# Patient Record
Sex: FEMALE | Race: ASIAN | NOT HISPANIC OR LATINO | URBAN - METROPOLITAN AREA
[De-identification: names, ages, dates, MRNs, and addresses within clinical notes are randomized per-mention and may not be internally consistent; named-entity substitution may affect disease eponyms.]

---

## 2021-07-27 ENCOUNTER — INPATIENT (INPATIENT)
Facility: HOSPITAL | Age: 21
LOS: 4 days | Discharge: HOME | End: 2021-08-01
Attending: SPECIALIST | Admitting: SPECIALIST
Payer: MEDICAID

## 2021-07-27 ENCOUNTER — EMERGENCY (EMERGENCY)
Facility: HOSPITAL | Age: 21
LOS: 1 days | Discharge: STILL ADMITTED OR ADMINISTRATIVE | End: 2021-07-27
Admitting: EMERGENCY MEDICINE
Payer: SELF-PAY

## 2021-07-27 VITALS
DIASTOLIC BLOOD PRESSURE: 86 MMHG | HEART RATE: 91 BPM | SYSTOLIC BLOOD PRESSURE: 125 MMHG | RESPIRATION RATE: 17 BRPM | TEMPERATURE: 98 F

## 2021-07-27 VITALS
RESPIRATION RATE: 18 BRPM | SYSTOLIC BLOOD PRESSURE: 127 MMHG | TEMPERATURE: 98 F | HEART RATE: 109 BPM | OXYGEN SATURATION: 100 % | DIASTOLIC BLOOD PRESSURE: 88 MMHG

## 2021-07-27 DIAGNOSIS — Z3A.00 WEEKS OF GESTATION OF PREGNANCY NOT SPECIFIED: ICD-10-CM

## 2021-07-27 DIAGNOSIS — O41.00X0 OLIGOHYDRAMNIOS, UNSPECIFIED TRIMESTER, NOT APPLICABLE OR UNSPECIFIED: ICD-10-CM

## 2021-07-27 DIAGNOSIS — O26.899 OTHER SPECIFIED PREGNANCY RELATED CONDITIONS, UNSPECIFIED TRIMESTER: ICD-10-CM

## 2021-07-27 LAB
BASOPHILS # BLD AUTO: 0.03 K/UL — SIGNIFICANT CHANGE UP (ref 0–0.2)
BASOPHILS NFR BLD AUTO: 0.3 % — SIGNIFICANT CHANGE UP (ref 0–2)
BLD GP AB SCN SERPL QL: NEGATIVE — SIGNIFICANT CHANGE UP
EOSINOPHIL # BLD AUTO: 0.05 K/UL — SIGNIFICANT CHANGE UP (ref 0–0.5)
EOSINOPHIL NFR BLD AUTO: 0.5 % — SIGNIFICANT CHANGE UP (ref 0–6)
HCT VFR BLD CALC: 37.4 % — SIGNIFICANT CHANGE UP (ref 34.5–45)
HGB BLD-MCNC: 11.8 G/DL — SIGNIFICANT CHANGE UP (ref 11.5–15.5)
IANC: 6.89 K/UL — SIGNIFICANT CHANGE UP (ref 1.5–8.5)
IMM GRANULOCYTES NFR BLD AUTO: 0.3 % — SIGNIFICANT CHANGE UP (ref 0–1.5)
LYMPHOCYTES # BLD AUTO: 1.97 K/UL — SIGNIFICANT CHANGE UP (ref 1–3.3)
LYMPHOCYTES # BLD AUTO: 20.4 % — SIGNIFICANT CHANGE UP (ref 13–44)
MCHC RBC-ENTMCNC: 24.9 PG — LOW (ref 27–34)
MCHC RBC-ENTMCNC: 31.6 GM/DL — LOW (ref 32–36)
MCV RBC AUTO: 78.9 FL — LOW (ref 80–100)
MONOCYTES # BLD AUTO: 0.67 K/UL — SIGNIFICANT CHANGE UP (ref 0–0.9)
MONOCYTES NFR BLD AUTO: 7 % — SIGNIFICANT CHANGE UP (ref 2–14)
NEUTROPHILS # BLD AUTO: 6.89 K/UL — SIGNIFICANT CHANGE UP (ref 1.8–7.4)
NEUTROPHILS NFR BLD AUTO: 71.5 % — SIGNIFICANT CHANGE UP (ref 43–77)
NRBC # BLD: 0 /100 WBCS — SIGNIFICANT CHANGE UP
NRBC # FLD: 0 K/UL — SIGNIFICANT CHANGE UP
PCP SPEC-MCNC: SIGNIFICANT CHANGE UP
PLATELET # BLD AUTO: 267 K/UL — SIGNIFICANT CHANGE UP (ref 150–400)
RBC # BLD: 4.74 M/UL — SIGNIFICANT CHANGE UP (ref 3.8–5.2)
RBC # FLD: 15.6 % — HIGH (ref 10.3–14.5)
RH IG SCN BLD-IMP: POSITIVE — SIGNIFICANT CHANGE UP
WBC # BLD: 9.64 K/UL — SIGNIFICANT CHANGE UP (ref 3.8–10.5)
WBC # FLD AUTO: 9.64 K/UL — SIGNIFICANT CHANGE UP (ref 3.8–10.5)

## 2021-07-27 PROCEDURE — L9993: CPT

## 2021-07-27 PROCEDURE — 59514 CESAREAN DELIVERY ONLY: CPT | Mod: U9,UB,GC

## 2021-07-27 RX ORDER — SODIUM CHLORIDE 9 MG/ML
1000 INJECTION, SOLUTION INTRAVENOUS
Refills: 0 | Status: DISCONTINUED | OUTPATIENT
Start: 2021-07-27 | End: 2021-07-28

## 2021-07-27 RX ORDER — SODIUM CHLORIDE 9 MG/ML
1000 INJECTION, SOLUTION INTRAVENOUS
Refills: 0 | Status: DISCONTINUED | OUTPATIENT
Start: 2021-07-27 | End: 2021-07-27

## 2021-07-27 RX ORDER — OXYTOCIN 10 UNIT/ML
333.33 VIAL (ML) INJECTION
Qty: 20 | Refills: 0 | Status: DISCONTINUED | OUTPATIENT
Start: 2021-07-27 | End: 2021-07-27

## 2021-07-27 RX ORDER — OXYTOCIN 10 UNIT/ML
333.33 VIAL (ML) INJECTION
Qty: 20 | Refills: 0 | Status: DISCONTINUED | OUTPATIENT
Start: 2021-07-27 | End: 2021-08-01

## 2021-07-27 RX ADMIN — SODIUM CHLORIDE 125 MILLILITER(S): 9 INJECTION, SOLUTION INTRAVENOUS at 18:51

## 2021-07-27 NOTE — OB RN PATIENT PROFILE - NS_OBGYNHISTORY_OBGYN_ALL_OB_FT
PNC with MD @ [place], seen in [facility] yesterday was told low FATOU and needed induction; signed out AMA after waiting 16 hours

## 2021-07-27 NOTE — OB PROVIDER H&P - NSHPPHYSICALEXAM_GEN_ALL_CORE
SVE: 1/50/-3  TAS: cephalic presentation, fundal placenta,   FATOU 6.28  abdomen: gravid, soft, nontender  FHT: category 1 tracing  TOCO: mild irregular contractions  EFW: 3175 grams  GBS unknown    Vital Signs Last 24 Hrs  T(C): 36.8 (27 Jul 2021 15:10), Max: 36.9 (27 Jul 2021 13:27)  T(F): 98.24 (27 Jul 2021 15:10), Max: 98.5 (27 Jul 2021 13:27)  HR: 77 (27 Jul 2021 17:13) (77 - 109)  BP: 126/91 (27 Jul 2021 17:13) (113/80 - 128/95)  BP(mean): --  RR: 17 (27 Jul 2021 15:10) (17 - 18)  SpO2: 100% (27 Jul 2021 13:27) (100% - 100%)

## 2021-07-27 NOTE — OB PROVIDER H&P - PROBLEM SELECTOR PLAN 1
d/w MD Sample  admit patient for post date induction  will call [facility] office for prenatal records  routine lab orders  for PO cytotec and cervical balloon  consents obtained  COVID PCR sent on patient and partner  GBS unknown

## 2021-07-27 NOTE — OB PROVIDER H&P - PROBLEM SELECTOR PLAN 2
d/w MD Sample  admit patient for post date induction  will call [facility] office for prenatal records  routine lab orders  for PO cytotec and cervical balloon  consents obtained  COVID PCR sent on patient  GBS unknown

## 2021-07-27 NOTE — OB RN PATIENT PROFILE - NS_SUPPORTPERSONNAME_OBGYN_ALL_OB_FT
Anastasia [facility] [phone] Anastasia [facility] [phone]

## 2021-07-27 NOTE — OB PROVIDER TRIAGE NOTE - NSOBPROVIDERNOTE_OBGYN_ALL_OB_FT
d/w MD Sample  admit patient for post date induction  will call [facility] office for prenatal records  routine lab orders  for PO cytotec and cervical balloon  consents obtained  COVID PCR sent on patient d/w MD Sample  admit patient for post date induction  will call [facility] office for prenatal records  routine lab orders  for PO cytotec and cervical balloon  consents obtained  COVID PCR sent on patient  GBS unknown

## 2021-07-27 NOTE — OB PROVIDER TRIAGE NOTE - HISTORY OF PRESENT ILLNESS
20 y/o  at 41.2 weeks gestation after leaving [facility] AMA early this AM after being advised for IOL for oligohydramnios w/ FATOU of 4 and waiting too long in ER. She received PNC with MD Xiomara Reynolds in [place], and was instructed to go to hospital yesterday after office OB appt and scanned showing FATOU of 5.2. [facility] did SVE @ 1cm, and gave her antibiotics for unknown GBS status. Denies lof, vb, uterine ctx, cramping. Reports good FM. Patient and partner not COVID vaccinated.    Pt reports AP course uncomplicated thus far  NKDA  Current medications:  -PNV  -Iron  -Vitamin D  Denies OBGYN history  Denies medical history  Denies surgical history

## 2021-07-27 NOTE — OB PROVIDER H&P - ATTENDING COMMENTS
OB Service Attending    P0 at 41 weeks presenting requesting for IOL. Patient without plan for IOL with her own physician. Will admit given late term and no safe discharge plan  -PO cytotec and cervical balloon    N Dayanna-MD Omar

## 2021-07-27 NOTE — OB RN TRIAGE NOTE - NS_OBGYNHISTORY_OBGYN_ALL_OB_FT
PNC with MD @ [place], seen in [facility] yesterday was told low FATOU and needed induction; signed out AMA PNC with MD @ [place], seen in [facility] yesterday was told low FATOU and needed induction; signed out AMA after waiting 16 hours

## 2021-07-27 NOTE — OB PROVIDER H&P - NS_OBGYNHISTORY_OBGYN_ALL_OB_FT
PNC with MD @ [place], seen in [facility] yesterday was told low FATOU and needed induction; signed out AMA after waiting 16 hours

## 2021-07-27 NOTE — OB PROVIDER H&P - HISTORY OF PRESENT ILLNESS
20 y/o  at 41.2 weeks gestation after leaving [facility] AMA early this AM after being advised for IOL for oligohydramnios w/ FATOU of 4 and waiting too long in ER. She received PNC with MD Xiomara Reynolds in [place], and was instructed to go to hospital yesterday after office OB appt and scanned showing FATOU of 5.2. [facility] did SVE @ 1cm, and gave her antibiotics for unknown GBS status. Denies lof, vb, uterine ctx, cramping. Reports good FM. Patient and partner not COVID vaccinated.    Pt reports AP course uncomplicated thus far  NKDA  Current medications:  -PNV  -Iron  -Vitamin D  Denies OBGYN history  Denies medical history  Denies surgical history

## 2021-07-27 NOTE — OB PROVIDER TRIAGE NOTE - NSHPPHYSICALEXAM_GEN_ALL_CORE
SVE: 1/50/-3  TAS: cephalic presentation, fundal placenta,   FATOU 6.28  abdomen: gravid, soft, nontender  FHT: category 1 tracing  TOCO: mild irregular contractions  Vital Signs Last 24 Hrs  T(C): 36.8 (27 Jul 2021 15:10), Max: 36.9 (27 Jul 2021 13:27)  T(F): 98.24 (27 Jul 2021 15:10), Max: 98.5 (27 Jul 2021 13:27)  HR: 77 (27 Jul 2021 17:13) (77 - 109)  BP: 126/91 (27 Jul 2021 17:13) (113/80 - 128/95)  BP(mean): --  RR: 17 (27 Jul 2021 15:10) (17 - 18)  SpO2: 100% (27 Jul 2021 13:27) (100% - 100%) SVE: 1/50/-3  TAS: cephalic presentation, fundal placenta,   FATOU 6.28  abdomen: gravid, soft, nontender  FHT: category 1 tracing  TOCO: mild irregular contractions  EFW:    Vital Signs Last 24 Hrs  T(C): 36.8 (27 Jul 2021 15:10), Max: 36.9 (27 Jul 2021 13:27)  T(F): 98.24 (27 Jul 2021 15:10), Max: 98.5 (27 Jul 2021 13:27)  HR: 77 (27 Jul 2021 17:13) (77 - 109)  BP: 126/91 (27 Jul 2021 17:13) (113/80 - 128/95)  BP(mean): --  RR: 17 (27 Jul 2021 15:10) (17 - 18)  SpO2: 100% (27 Jul 2021 13:27) (100% - 100%) SVE: 1/50/-3  TAS: cephalic presentation, fundal placenta,   FATOU 6.28  abdomen: gravid, soft, nontender  FHT: category 1 tracing  TOCO: mild irregular contractions  EFW: 3175 grams  GBS unknown    Vital Signs Last 24 Hrs  T(C): 36.8 (27 Jul 2021 15:10), Max: 36.9 (27 Jul 2021 13:27)  T(F): 98.24 (27 Jul 2021 15:10), Max: 98.5 (27 Jul 2021 13:27)  HR: 77 (27 Jul 2021 17:13) (77 - 109)  BP: 126/91 (27 Jul 2021 17:13) (113/80 - 128/95)  BP(mean): --  RR: 17 (27 Jul 2021 15:10) (17 - 18)  SpO2: 100% (27 Jul 2021 13:27) (100% - 100%)

## 2021-07-28 LAB
COVID-19 SPIKE DOMAIN AB INTERP: POSITIVE
COVID-19 SPIKE DOMAIN ANTIBODY RESULT: 27 U/ML — HIGH
SARS-COV-2 IGG+IGM SERPL QL IA: 27 U/ML — HIGH
SARS-COV-2 IGG+IGM SERPL QL IA: POSITIVE
SARS-COV-2 RNA SPEC QL NAA+PROBE: SIGNIFICANT CHANGE UP

## 2021-07-28 RX ORDER — SODIUM CHLORIDE 9 MG/ML
1000 INJECTION INTRAMUSCULAR; INTRAVENOUS; SUBCUTANEOUS
Refills: 0 | Status: DISCONTINUED | OUTPATIENT
Start: 2021-07-28 | End: 2021-07-29

## 2021-07-28 RX ORDER — SODIUM CHLORIDE 9 MG/ML
300 INJECTION, SOLUTION INTRAVENOUS ONCE
Refills: 0 | Status: DISCONTINUED | OUTPATIENT
Start: 2021-07-28 | End: 2021-07-28

## 2021-07-28 RX ORDER — SODIUM CHLORIDE 9 MG/ML
300 INJECTION INTRAMUSCULAR; INTRAVENOUS; SUBCUTANEOUS ONCE
Refills: 0 | Status: COMPLETED | OUTPATIENT
Start: 2021-07-28 | End: 2021-07-28

## 2021-07-28 RX ORDER — SODIUM CHLORIDE 9 MG/ML
1000 INJECTION, SOLUTION INTRAVENOUS
Refills: 0 | Status: DISCONTINUED | OUTPATIENT
Start: 2021-07-28 | End: 2021-07-29

## 2021-07-28 RX ORDER — OXYTOCIN 10 UNIT/ML
2 VIAL (ML) INJECTION
Qty: 30 | Refills: 0 | Status: DISCONTINUED | OUTPATIENT
Start: 2021-07-28 | End: 2021-07-29

## 2021-07-28 RX ORDER — OXYTOCIN 10 UNIT/ML
333.33 VIAL (ML) INJECTION
Qty: 20 | Refills: 0 | Status: DISCONTINUED | OUTPATIENT
Start: 2021-07-28 | End: 2021-08-01

## 2021-07-28 RX ADMIN — SODIUM CHLORIDE 180 MILLILITER(S): 9 INJECTION INTRAMUSCULAR; INTRAVENOUS; SUBCUTANEOUS at 22:26

## 2021-07-28 RX ADMIN — SODIUM CHLORIDE 125 MILLILITER(S): 9 INJECTION, SOLUTION INTRAVENOUS at 18:57

## 2021-07-28 RX ADMIN — SODIUM CHLORIDE 600 MILLILITER(S): 9 INJECTION INTRAMUSCULAR; INTRAVENOUS; SUBCUTANEOUS at 21:16

## 2021-07-28 RX ADMIN — Medication 2 MILLIUNIT(S)/MIN: at 18:46

## 2021-07-28 NOTE — OB PROVIDER LABOR PROGRESS NOTE - NS_OBIHIFHRDETAILS_OBGYN_ALL_OB_FT
145/mod variability/+accels/-decels
155/mod ruben/+accels/-decels
variable decelerations and late decelerations
125/mod/+accels/-decels

## 2021-07-28 NOTE — OB PROVIDER LABOR PROGRESS NOTE - ASSESSMENT
Patient examined at bedside due to recurrent variable decelerations s/p SROM  -IUPC/AI  -Continue FHR/toco  -Fluid bolus    Discussed w Dr. Dominic Dupont, PGY-1
Plan:  - Con't IOL with PO cytotec and CB  - Con't EFM, toco  - Con't IVF    d/w Dr. Gonzales, PGY-4  Cecelia Wilson, PGY-1
Plan:  - Pt is for epidural  - Con't IOL with PO cytotec  - CB in place  - Con't EFM, toco  - Con't IVF    D/w Dr. Gonzales, PGY-4  Cecelia Wilson, PGY-1
Patient examined at bedside for cervical change  -continue pit  -continue resuscitative measures (O2, side flipping)  -continue FHR/toco    Discussed w Dr. Dominic Dupont, PGY-1

## 2021-07-28 NOTE — OB PROVIDER LABOR PROGRESS NOTE - NS_SUBJECTIVE/OBJECTIVE_OBGYN_ALL_OB_FT
Pt seen at bedside requesting epidural. CB in place.
Patient examiend at bedside due to recurrent varaible decelerations s/p SROM (note delayed due to clinical duties)
Placement seen at bedside for placement of CB.
Patient examined for cervical change

## 2021-07-28 NOTE — CHART NOTE - NSCHARTNOTEFT_GEN_A_CORE
PA Note    seen & examined for cont of management  cervical balloon fell out  comfortable with epidural    VS  T(C): 36.7 (07-28-21 @ 12:00)  HR: 75 (07-28-21 @ 17:08)  BP: 113/65 (07-28-21 @ 17:01)  RR: 16 (07-27-21 @ 21:11)  SpO2: 100% (07-28-21 @ 17:03)    /mod ruben/+accels/no decels  [place] irreg - toco adjusted  VE 4.5/90/-2    cont efm/toco  sp PO cytotec dose at 430p  will discuss pitocin for continued augmentation with SVC attending  wendi maynard PA Note    seen & examined for cont of management  cervical balloon fell out  comfortable with epidural    VS  T(C): 36.7 (07-28-21 @ 12:00)  HR: 75 (07-28-21 @ 17:08)  BP: 113/65 (07-28-21 @ 17:01)  RR: 16 (07-27-21 @ 21:11)  SpO2: 100% (07-28-21 @ 17:03)    /mod ruben/+accels/no decels  [place] irreg - toco adjusted  VE 4.5/90/-2    cont efm/toco  sp PO cytotec dose at 430p  will discuss pitocin for continued augmentation with SVC attending  wendi maynard    addendum:  discussed with Dr Alfaro - plan for continued pitocin augmentation  wendi maynard

## 2021-07-29 LAB — T PALLIDUM AB TITR SER: NEGATIVE — SIGNIFICANT CHANGE UP

## 2021-07-29 RX ORDER — IBUPROFEN 200 MG
600 TABLET ORAL EVERY 6 HOURS
Refills: 0 | Status: COMPLETED | OUTPATIENT
Start: 2021-07-29 | End: 2022-06-27

## 2021-07-29 RX ORDER — ONDANSETRON 8 MG/1
4 TABLET, FILM COATED ORAL EVERY 6 HOURS
Refills: 0 | Status: DISCONTINUED | OUTPATIENT
Start: 2021-07-29 | End: 2021-08-01

## 2021-07-29 RX ORDER — MORPHINE SULFATE 50 MG/1
1 CAPSULE, EXTENDED RELEASE ORAL ONCE
Refills: 0 | Status: DISCONTINUED | OUTPATIENT
Start: 2021-07-29 | End: 2021-08-01

## 2021-07-29 RX ORDER — KETOROLAC TROMETHAMINE 30 MG/ML
30 SYRINGE (ML) INJECTION EVERY 6 HOURS
Refills: 0 | Status: DISCONTINUED | OUTPATIENT
Start: 2021-07-29 | End: 2021-07-30

## 2021-07-29 RX ORDER — TETANUS TOXOID, REDUCED DIPHTHERIA TOXOID AND ACELLULAR PERTUSSIS VACCINE, ADSORBED 5; 2.5; 8; 8; 2.5 [IU]/.5ML; [IU]/.5ML; UG/.5ML; UG/.5ML; UG/.5ML
0.5 SUSPENSION INTRAMUSCULAR ONCE
Refills: 0 | Status: COMPLETED | OUTPATIENT
Start: 2021-07-29

## 2021-07-29 RX ORDER — SODIUM CHLORIDE 9 MG/ML
1000 INJECTION, SOLUTION INTRAVENOUS
Refills: 0 | Status: DISCONTINUED | OUTPATIENT
Start: 2021-07-29 | End: 2021-08-01

## 2021-07-29 RX ORDER — HEPARIN SODIUM 5000 [USP'U]/ML
5000 INJECTION INTRAVENOUS; SUBCUTANEOUS EVERY 12 HOURS
Refills: 0 | Status: DISCONTINUED | OUTPATIENT
Start: 2021-07-29 | End: 2021-08-01

## 2021-07-29 RX ORDER — OXYCODONE HYDROCHLORIDE 5 MG/1
5 TABLET ORAL ONCE
Refills: 0 | Status: DISCONTINUED | OUTPATIENT
Start: 2021-07-29 | End: 2021-08-01

## 2021-07-29 RX ORDER — DIPHENHYDRAMINE HCL 50 MG
25 CAPSULE ORAL EVERY 6 HOURS
Refills: 0 | Status: DISCONTINUED | OUTPATIENT
Start: 2021-07-29 | End: 2021-08-01

## 2021-07-29 RX ORDER — NALOXONE HYDROCHLORIDE 4 MG/.1ML
0.1 SPRAY NASAL
Refills: 0 | Status: DISCONTINUED | OUTPATIENT
Start: 2021-07-29 | End: 2021-08-01

## 2021-07-29 RX ORDER — ACETAMINOPHEN 500 MG
1000 TABLET ORAL ONCE
Refills: 0 | Status: DISCONTINUED | OUTPATIENT
Start: 2021-07-29 | End: 2021-07-29

## 2021-07-29 RX ORDER — HYDROMORPHONE HYDROCHLORIDE 2 MG/ML
0.5 INJECTION INTRAMUSCULAR; INTRAVENOUS; SUBCUTANEOUS
Refills: 0 | Status: DISCONTINUED | OUTPATIENT
Start: 2021-07-29 | End: 2021-07-29

## 2021-07-29 RX ORDER — OXYTOCIN 10 UNIT/ML
333.33 VIAL (ML) INJECTION
Qty: 20 | Refills: 0 | Status: DISCONTINUED | OUTPATIENT
Start: 2021-07-29 | End: 2021-08-01

## 2021-07-29 RX ORDER — ONDANSETRON 8 MG/1
4 TABLET, FILM COATED ORAL ONCE
Refills: 0 | Status: DISCONTINUED | OUTPATIENT
Start: 2021-07-29 | End: 2021-07-29

## 2021-07-29 RX ORDER — SIMETHICONE 80 MG/1
80 TABLET, CHEWABLE ORAL EVERY 4 HOURS
Refills: 0 | Status: DISCONTINUED | OUTPATIENT
Start: 2021-07-29 | End: 2021-08-01

## 2021-07-29 RX ORDER — MAGNESIUM HYDROXIDE 400 MG/1
30 TABLET, CHEWABLE ORAL
Refills: 0 | Status: DISCONTINUED | OUTPATIENT
Start: 2021-07-29 | End: 2021-08-01

## 2021-07-29 RX ORDER — HYDROMORPHONE HYDROCHLORIDE 2 MG/ML
1 INJECTION INTRAMUSCULAR; INTRAVENOUS; SUBCUTANEOUS
Refills: 0 | Status: DISCONTINUED | OUTPATIENT
Start: 2021-07-29 | End: 2021-07-29

## 2021-07-29 RX ORDER — DEXAMETHASONE 0.5 MG/5ML
4 ELIXIR ORAL EVERY 6 HOURS
Refills: 0 | Status: DISCONTINUED | OUTPATIENT
Start: 2021-07-29 | End: 2021-08-01

## 2021-07-29 RX ORDER — ACETAMINOPHEN 500 MG
975 TABLET ORAL
Refills: 0 | Status: DISCONTINUED | OUTPATIENT
Start: 2021-07-29 | End: 2021-08-01

## 2021-07-29 RX ORDER — OXYCODONE HYDROCHLORIDE 5 MG/1
5 TABLET ORAL
Refills: 0 | Status: DISCONTINUED | OUTPATIENT
Start: 2021-07-29 | End: 2021-08-01

## 2021-07-29 RX ORDER — LANOLIN
1 OINTMENT (GRAM) TOPICAL EVERY 6 HOURS
Refills: 0 | Status: DISCONTINUED | OUTPATIENT
Start: 2021-07-29 | End: 2021-08-01

## 2021-07-29 RX ADMIN — Medication 30 MILLIGRAM(S): at 20:56

## 2021-07-29 RX ADMIN — Medication 975 MILLIGRAM(S): at 11:01

## 2021-07-29 RX ADMIN — Medication 30 MILLIGRAM(S): at 21:19

## 2021-07-29 RX ADMIN — SODIUM CHLORIDE 75 MILLILITER(S): 9 INJECTION, SOLUTION INTRAVENOUS at 06:31

## 2021-07-29 RX ADMIN — Medication 1000 MILLIUNIT(S)/MIN: at 06:31

## 2021-07-29 RX ADMIN — Medication 30 MILLIGRAM(S): at 15:00

## 2021-07-29 RX ADMIN — HEPARIN SODIUM 5000 UNIT(S): 5000 INJECTION INTRAVENOUS; SUBCUTANEOUS at 11:01

## 2021-07-29 RX ADMIN — Medication 0.25 MILLIGRAM(S): at 04:36

## 2021-07-29 RX ADMIN — Medication 30 MILLIGRAM(S): at 14:38

## 2021-07-29 RX ADMIN — Medication 30 MILLIGRAM(S): at 08:45

## 2021-07-29 RX ADMIN — Medication 30 MILLIGRAM(S): at 09:00

## 2021-07-29 NOTE — OB RN DELIVERY SUMMARY - NS_BREASTACTIONA_OBGYN_ALL_OB
Alert and oriented to person, place and time, memory intact, behavior appropriate to situation, PERRL. No action was needed

## 2021-07-29 NOTE — OB NEONATOLOGY/PEDIATRICIAN DELIVERY SUMMARY - NSPEDSNEONOTESA_OBGYN_ALL_OB_FT
41.4 wk female born via stat c/s for cat II tracing to a 20 y/o  A+, GBS unk, PNL unremarkable with SROM 8 hrs PTD and clear fluids. No significant maternal history. IOL for post dates. Infant emerged with strong cry and apgars 9/9. Routine care provided. Transferred to well baby nursery. 41.4 wk female born via stat c/s for cat II tracing to a 20 y/o  A+, GBS unk, PNL unremarkable with SROM 8 hrs PTD and clear fluids. No significant maternal history. IOL for post dates. Infant emerged with strong cry and apgars 9/9. Routine care provided. Transferred to well baby nursery. JOSE ANGEL Bryant present at delivery

## 2021-07-29 NOTE — CHART NOTE - NSCHARTNOTEFT_GEN_A_CORE
Attending Note     Went to examine patient for category 2 tracing       JAMIE Stone MD Attending Note     Went to examine patient for category 2 tracing with recurrent decelerations  SVE: anterior lip  OP asynclitic ++ caput   Pitocin turned off, IV fluids given and pt repositioned   FHTS 90s x 8 minutes, terbutaline given FHTs returned to 110s (Baseline 140s)   Decision made to proceed with primary low transverse c/section   Consent obtained   Anesthesia notified       JAMIE Stone MD

## 2021-07-29 NOTE — OB PROVIDER DELIVERY SUMMARY - NSSELHIDDEN_OBGYN_ALL_OB_FT
[NS_DeliveryAttending1_OBGYN_ALL_OB_FT:[id number]==],[NS_DeliveryAssist1_OBGYN_ALL_OB_FT:[id number]=],[NS_DeliveryRN_OBGYN_ALL_OB_FT:[id number]=]
Hyperbilirubinemia

## 2021-07-29 NOTE — OB RN DELIVERY SUMMARY - NSSELHIDDEN_OBGYN_ALL_OB_FT
[NS_DeliveryAttending1_OBGYN_ALL_OB_FT:[id number]],[NS_DeliveryAssist1_OBGYN_ALL_OB_FT:[id number]=],[NS_DeliveryRN_OBGYN_ALL_OB_FT:[id number]=]

## 2021-07-29 NOTE — OB NEONATOLOGY/PEDIATRICIAN DELIVERY SUMMARY - BABY A: APGAR 5 MIN MUSCLE TONE, DELIVERY
"/62   Pulse 101   Temp 98.4  F (36.9  C) (Oral)   Resp 13   Ht 1.575 m (5' 2\")   Wt 42.8 kg (94 lb 6.4 oz)   LMP 02/10/2021   SpO2 100%   BMI 17.27 kg/m    Patient in for Female Px.  /Alicia Almanza CMA    "
(2) well flexed

## 2021-07-29 NOTE — OB PROVIDER DELIVERY SUMMARY - NSPROVIDERDELIVERYNOTE_OBGYN_ALL_OB_FT
unscheduled urgent pLTCS for NRFHT  Viable female infant, apgars 9/9, weight 3130g  Hysterotomy closed in 1 layer using caprosyn  IM methergine given for atony with good response  Grossly normal uterus, tubes, and ovaries  Abdomen closed in standard fashion  Pt and infant to recovery in stable condition  QBL: 262   IVF: 1500    UOP: 300

## 2021-07-29 NOTE — OB RN DELIVERY SUMMARY - NS_SEPSISRSKCALC_OBGYN_ALL_OB_FT
EOS calculated successfully. EOS Risk Factor: 0.5/1000 live births ([facility] national incidence); GA=41w4d; Temp=98.6; ROM=8.15; GBS='Unknown'; Antibiotics='No antibiotics or any antibiotics < 2 hrs prior to birth'

## 2021-07-29 NOTE — OB RN INTRAOPERATIVE NOTE - NSSELHIDDEN_OBGYN_ALL_OB_FT
[NS_DeliveryAttending1_OBGYN_ALL_OB_FT:[id number]==],[NS_DeliveryAssist1_OBGYN_ALL_OB_FT:[id number]=],[NS_DeliveryRN_OBGYN_ALL_OB_FT:[id number]=]

## 2021-07-30 LAB
BASOPHILS # BLD AUTO: 0.02 K/UL — SIGNIFICANT CHANGE UP (ref 0–0.2)
BASOPHILS NFR BLD AUTO: 0.1 % — SIGNIFICANT CHANGE UP (ref 0–2)
EOSINOPHIL # BLD AUTO: 0.1 K/UL — SIGNIFICANT CHANGE UP (ref 0–0.5)
EOSINOPHIL NFR BLD AUTO: 0.7 % — SIGNIFICANT CHANGE UP (ref 0–6)
HCT VFR BLD CALC: 28.8 % — LOW (ref 34.5–45)
HGB BLD-MCNC: 9.3 G/DL — LOW (ref 11.5–15.5)
IANC: 11.47 K/UL — HIGH (ref 1.5–8.5)
IMM GRANULOCYTES NFR BLD AUTO: 0.6 % — SIGNIFICANT CHANGE UP (ref 0–1.5)
LYMPHOCYTES # BLD AUTO: 1.57 K/UL — SIGNIFICANT CHANGE UP (ref 1–3.3)
LYMPHOCYTES # BLD AUTO: 11.2 % — LOW (ref 13–44)
MCHC RBC-ENTMCNC: 25.5 PG — LOW (ref 27–34)
MCHC RBC-ENTMCNC: 32.3 GM/DL — SIGNIFICANT CHANGE UP (ref 32–36)
MCV RBC AUTO: 79.1 FL — LOW (ref 80–100)
MONOCYTES # BLD AUTO: 0.76 K/UL — SIGNIFICANT CHANGE UP (ref 0–0.9)
MONOCYTES NFR BLD AUTO: 5.4 % — SIGNIFICANT CHANGE UP (ref 2–14)
NEUTROPHILS # BLD AUTO: 11.47 K/UL — HIGH (ref 1.8–7.4)
NEUTROPHILS NFR BLD AUTO: 82 % — HIGH (ref 43–77)
NRBC # BLD: 0 /100 WBCS — SIGNIFICANT CHANGE UP
NRBC # FLD: 0 K/UL — SIGNIFICANT CHANGE UP
PLATELET # BLD AUTO: 153 K/UL — SIGNIFICANT CHANGE UP (ref 150–400)
RBC # BLD: 3.64 M/UL — LOW (ref 3.8–5.2)
RBC # FLD: 15.3 % — HIGH (ref 10.3–14.5)
WBC # BLD: 14.01 K/UL — HIGH (ref 3.8–10.5)
WBC # FLD AUTO: 14.01 K/UL — HIGH (ref 3.8–10.5)

## 2021-07-30 RX ORDER — SENNA PLUS 8.6 MG/1
1 TABLET ORAL
Refills: 0 | Status: DISCONTINUED | OUTPATIENT
Start: 2021-07-30 | End: 2021-08-01

## 2021-07-30 RX ORDER — IBUPROFEN 200 MG
1 TABLET ORAL
Qty: 0 | Refills: 0 | DISCHARGE
Start: 2021-07-30

## 2021-07-30 RX ORDER — OXYCODONE HYDROCHLORIDE 5 MG/1
1 TABLET ORAL
Qty: 0 | Refills: 0 | DISCHARGE
Start: 2021-07-30

## 2021-07-30 RX ORDER — CHOLECALCIFEROL (VITAMIN D3) 125 MCG
1 CAPSULE ORAL
Qty: 0 | Refills: 0 | DISCHARGE

## 2021-07-30 RX ORDER — BENZOYL PEROXIDE MICRONIZED 5.8 %
1 TOWELETTE (EA) TOPICAL
Qty: 0 | Refills: 0 | DISCHARGE

## 2021-07-30 RX ORDER — IBUPROFEN 200 MG
600 TABLET ORAL EVERY 6 HOURS
Refills: 0 | Status: DISCONTINUED | OUTPATIENT
Start: 2021-07-30 | End: 2021-08-01

## 2021-07-30 RX ORDER — ACETAMINOPHEN 500 MG
3 TABLET ORAL
Qty: 0 | Refills: 0 | DISCHARGE
Start: 2021-07-30

## 2021-07-30 RX ADMIN — Medication 975 MILLIGRAM(S): at 01:00

## 2021-07-30 RX ADMIN — SIMETHICONE 80 MILLIGRAM(S): 80 TABLET, CHEWABLE ORAL at 17:41

## 2021-07-30 RX ADMIN — Medication 600 MILLIGRAM(S): at 17:41

## 2021-07-30 RX ADMIN — Medication 975 MILLIGRAM(S): at 22:55

## 2021-07-30 RX ADMIN — Medication 600 MILLIGRAM(S): at 11:53

## 2021-07-30 RX ADMIN — Medication 975 MILLIGRAM(S): at 23:55

## 2021-07-30 RX ADMIN — Medication 600 MILLIGRAM(S): at 06:19

## 2021-07-30 RX ADMIN — SIMETHICONE 80 MILLIGRAM(S): 80 TABLET, CHEWABLE ORAL at 00:16

## 2021-07-30 RX ADMIN — HEPARIN SODIUM 5000 UNIT(S): 5000 INJECTION INTRAVENOUS; SUBCUTANEOUS at 11:53

## 2021-07-30 RX ADMIN — HEPARIN SODIUM 5000 UNIT(S): 5000 INJECTION INTRAVENOUS; SUBCUTANEOUS at 00:14

## 2021-07-30 RX ADMIN — Medication 975 MILLIGRAM(S): at 00:15

## 2021-07-30 RX ADMIN — Medication 600 MILLIGRAM(S): at 12:25

## 2021-07-30 RX ADMIN — SENNA PLUS 1 TABLET(S): 8.6 TABLET ORAL at 22:56

## 2021-07-30 NOTE — DISCHARGE NOTE OB - CARE PROVIDER_API CALL
Xiomara Reynolds  OBSTETRICS AND GYNECOLOGY  [address] [place] [address]  Phone: [phone]  Fax: [phone]  Follow Up Time:

## 2021-07-30 NOTE — DISCHARGE NOTE OB - PLAN OF CARE
After discharge, please stay on pelvic rest for 6 weeks, meaning no sexual intercourse, no tampons and no douching.  No driving for 2 weeks as women can loose a lot of blood during delivery and there is a possibility of being lightheaded/fainting.  No lifting objects heavier than a gallon of milk for two weeks.  Expect to have vaginal bleeding/spotting for up to six weeks.  The bleeding should get lighter and more white/light brown with time.  For bleeding soaking more than a pad an hour or passing clots greater than the size of your fist, come in to the emergency department.    Follow up in clinic in 2 weeks for incision check.  Call clinic for noticeable increase in redness or swelling at incision, discharge from incision, or opening of skin at incision site. Recovery

## 2021-07-30 NOTE — DISCHARGE NOTE OB - PATIENT PORTAL LINK FT
You can access the FollowMyHealth Patient Portal offered by [facility] by registering at the following website: [email or web address]. By joining [facility]’s FollowMyHealth portal, you will also be able to view your health information using other applications (apps) compatible with our system.

## 2021-07-30 NOTE — PROGRESS NOTE ADULT - PROBLEM SELECTOR PLAN 1
- Continue with pain management  - Increase ambulation  - Continue regular diet  - Check CBC  - POD #1 dressing removed    Korina Mendoza  PGY-1

## 2021-07-30 NOTE — LACTATION INITIAL EVALUATION - LACTATION INTERVENTIONS
initiate/review safe skin-to-skin/initiate/review hand expression/initiate/review techniques for position and latch/review techniques to manage sore nipples/engorgement/initiate/review breast massage/compression/reviewed components of an effective feeding and at least 8 effective feedings per day required/reviewed importance of monitoring infant diapers, the breastfeeding log, and minimum output each day/reviewed risks of unnecessary formula supplementation/reviewed feeding on demand/by cue at least 8 times a day

## 2021-07-30 NOTE — DISCHARGE NOTE OB - HOSPITAL COURSE
Patient had uncomplicated low transverse  section for nonreassuring fetal heart tracing with delivery of a liveborn female infant. During postpartum course patient's vitals were stable, vaginal bleeding appropriate, and pain well controlled.  Post-operation Day #1 hematocrit was appropriate. On the day of discharge patient was ambulating, with pain controlled with oral medications, having adequate oral intake, and voiding freely.  Discharge instructions and precautions were given.  Will return to clinic in 2 weeks for incision check, to follow with Dr. Vinita Reynolds for postpartum care.

## 2021-07-30 NOTE — DISCHARGE NOTE OB - MEDICATION SUMMARY - MEDICATIONS TO TAKE
I will START or STAY ON the medications listed below when I get home from the hospital:    acetaminophen 325 mg oral tablet  -- 3 tab(s) by mouth   -- Indication: For for pain    ibuprofen 600 mg oral tablet  -- 1 tab(s) by mouth every 6 hours  -- Indication: For for pain    oxyCODONE 5 mg oral tablet  -- 1 tab(s) by mouth every 6 to 8 hours, As Needed -10)  -- Indication: For for pain

## 2021-07-31 RX ORDER — TETANUS TOXOID, REDUCED DIPHTHERIA TOXOID AND ACELLULAR PERTUSSIS VACCINE, ADSORBED 5; 2.5; 8; 8; 2.5 [IU]/.5ML; [IU]/.5ML; UG/.5ML; UG/.5ML; UG/.5ML
0.5 SUSPENSION INTRAMUSCULAR ONCE
Refills: 0 | Status: COMPLETED | OUTPATIENT
Start: 2021-07-31 | End: 2021-07-31

## 2021-07-31 RX ADMIN — Medication 600 MILLIGRAM(S): at 01:03

## 2021-07-31 RX ADMIN — HEPARIN SODIUM 5000 UNIT(S): 5000 INJECTION INTRAVENOUS; SUBCUTANEOUS at 13:05

## 2021-07-31 RX ADMIN — Medication 975 MILLIGRAM(S): at 09:06

## 2021-07-31 RX ADMIN — Medication 600 MILLIGRAM(S): at 04:29

## 2021-07-31 RX ADMIN — SENNA PLUS 1 TABLET(S): 8.6 TABLET ORAL at 22:12

## 2021-07-31 RX ADMIN — HEPARIN SODIUM 5000 UNIT(S): 5000 INJECTION INTRAVENOUS; SUBCUTANEOUS at 00:06

## 2021-07-31 RX ADMIN — Medication 975 MILLIGRAM(S): at 22:42

## 2021-07-31 RX ADMIN — Medication 975 MILLIGRAM(S): at 16:00

## 2021-07-31 RX ADMIN — Medication 600 MILLIGRAM(S): at 13:20

## 2021-07-31 RX ADMIN — Medication 975 MILLIGRAM(S): at 09:30

## 2021-07-31 RX ADMIN — TETANUS TOXOID, REDUCED DIPHTHERIA TOXOID AND ACELLULAR PERTUSSIS VACCINE, ADSORBED 0.5 MILLILITER(S): 5; 2.5; 8; 8; 2.5 SUSPENSION INTRAMUSCULAR at 12:46

## 2021-07-31 RX ADMIN — Medication 600 MILLIGRAM(S): at 18:51

## 2021-07-31 RX ADMIN — Medication 600 MILLIGRAM(S): at 12:45

## 2021-07-31 RX ADMIN — Medication 600 MILLIGRAM(S): at 18:01

## 2021-07-31 RX ADMIN — Medication 975 MILLIGRAM(S): at 15:24

## 2021-07-31 RX ADMIN — Medication 600 MILLIGRAM(S): at 05:30

## 2021-07-31 RX ADMIN — Medication 975 MILLIGRAM(S): at 22:12

## 2021-07-31 NOTE — PROGRESS NOTE ADULT - ATTENDING COMMENTS
Attending Note   Agree with above   pt reports pain is adequately controlled   voiding freely   tolerating po + flatus   abd soft, fundus firm nontender  Incision c/d/i  Ext: 1+ edema b/l  routine postop care  encourage ambulation   anticipate d/c home in AM     R Bertha ZHAO
Attending Note     POD 1 s/p pLTCS   pain adequately controlled   voiding freely   tolerating po + flatus  ambulating with no issues   AFVSS  GEn in NAD   Abd soft, fundus firm nontender  Incision c/d/i   Ext: no c/c/e     A/P POD 1 s/p pLTCS  doing well   routine postop care  encourage ambulation      JAMIE Stone MD

## 2021-08-01 VITALS
RESPIRATION RATE: 18 BRPM | OXYGEN SATURATION: 100 % | SYSTOLIC BLOOD PRESSURE: 129 MMHG | DIASTOLIC BLOOD PRESSURE: 85 MMHG | HEART RATE: 80 BPM | TEMPERATURE: 99 F

## 2021-08-01 RX ADMIN — Medication 600 MILLIGRAM(S): at 06:35

## 2021-08-01 RX ADMIN — Medication 600 MILLIGRAM(S): at 06:05

## 2021-08-01 RX ADMIN — Medication 975 MILLIGRAM(S): at 03:22

## 2021-08-01 RX ADMIN — Medication 975 MILLIGRAM(S): at 03:52

## 2021-08-01 RX ADMIN — HEPARIN SODIUM 5000 UNIT(S): 5000 INJECTION INTRAVENOUS; SUBCUTANEOUS at 00:32

## 2021-08-01 RX ADMIN — Medication 600 MILLIGRAM(S): at 00:33

## 2021-08-01 NOTE — PROGRESS NOTE ADULT - SUBJECTIVE AND OBJECTIVE BOX
OB Progress Note: LTCS, POD#2    S: 22yo POD#2 s/p LTCS. Pain is well controlled. She is tolerating a regular diet and passing flatus. She is voiding spontaneously and ambulating without difficulty. Denies CP/SOB, lightheadedness/dizziness, N/V.    O:  Vitals:  Vital Signs Last 24 Hrs  T(C): 36.7 (31 Jul 2021 05:51), Max: 37.2 (30 Jul 2021 13:49)  T(F): 98.1 (31 Jul 2021 05:51), Max: 99 (30 Jul 2021 13:49)  HR: 80 (31 Jul 2021 05:51) (80 - 100)  BP: 123/88 (31 Jul 2021 05:51) (123/88 - 131/75)  BP(mean): --  RR: 18 (31 Jul 2021 05:51) (16 - 18)  SpO2: 100% (31 Jul 2021 05:51) (99% - 100%)    MEDICATIONS  (STANDING):  acetaminophen   Tablet .. 975 milliGRAM(s) Oral <User Schedule>  diphtheria/tetanus/pertussis (acellular) Vaccine (ADAcel) 0.5 milliLiter(s) IntraMuscular once  heparin   Injectable 5000 Unit(s) SubCutaneous every 12 hours  ibuprofen  Tablet. 600 milliGRAM(s) Oral every 6 hours  lactated ringers. 1000 milliLiter(s) (75 mL/Hr) IV Continuous <Continuous>  morphine PF Epidural 1 milliGRAM(s) Epidural once  oxytocin Infusion 333.333 milliUNIT(s)/Min (1000 mL/Hr) IV Continuous <Continuous>  oxytocin Infusion 333.333 milliUNIT(s)/Min (1000 mL/Hr) IV Continuous <Continuous>  oxytocin Infusion 333.333 milliUNIT(s)/Min (1000 mL/Hr) IV Continuous <Continuous>      MEDICATIONS  (PRN):  dexAMETHasone  Injectable 4 milliGRAM(s) IV Push every 6 hours PRN Nausea  diphenhydrAMINE 25 milliGRAM(s) Oral every 6 hours PRN Pruritus  lanolin Ointment 1 Application(s) Topical every 6 hours PRN Sore Nipples  magnesium hydroxide Suspension 30 milliLiter(s) Oral two times a day PRN Constipation  naloxone Injectable 0.1 milliGRAM(s) IV Push every 3 minutes PRN For ANY of the following changes in patient status:  A. Breaths Per Minute LESS THAN 10, B. Oxygen saturation LESS THAN 90%, C. Sedation score of 6 for Stop After: 4 Times  ondansetron Injectable 4 milliGRAM(s) IV Push every 6 hours PRN Nausea  oxyCODONE    IR 5 milliGRAM(s) Oral every 3 hours PRN Moderate to Severe Pain (4-10)  oxyCODONE    IR 5 milliGRAM(s) Oral once PRN Moderate to Severe Pain (4-10)  senna 1 Tablet(s) Oral two times a day PRN Constipation  simethicone 80 milliGRAM(s) Chew every 4 hours PRN Gas      Labs:  Blood type: A Positive  Rubella IgG: RPR: Negative                          9.3<L>   14.01<H> >-----------< 153    ( 07-30 @ 05:49 )             28.8<L>        PE:  General: NAD  Abdomen: Soft, appropriately tender, incision c/d/i.  Extremities: No erythema, no pitting edema  
OB Progress Note: LTCS, POD#3    S: 20yo POD#3 s/p LTCS. Pain is well controlled. She is tolerating a regular diet and passing flatus. She is voiding spontaneously and ambulating without difficulty. Denies CP/SOB, lightheadedness/dizziness, N/V.    O:  Vitals:  Vital Signs Last 24 Hrs  T(C): 37 (01 Aug 2021 05:52), Max: 37 (01 Aug 2021 05:52)  T(F): 98.6 (01 Aug 2021 05:52), Max: 98.6 (01 Aug 2021 05:52)  HR: 77 (01 Aug 2021 05:52) (71 - 77)  BP: 136/91 (01 Aug 2021 05:52) (114/55 - 136/91)  BP(mean): --  RR: 17 (01 Aug 2021 05:52) (16 - 18)  SpO2: 100% (01 Aug 2021 05:52) (99% - 100%)    MEDICATIONS  (STANDING):  acetaminophen   Tablet .. 975 milliGRAM(s) Oral <User Schedule>  heparin   Injectable 5000 Unit(s) SubCutaneous every 12 hours  ibuprofen  Tablet. 600 milliGRAM(s) Oral every 6 hours  lactated ringers. 1000 milliLiter(s) (75 mL/Hr) IV Continuous <Continuous>  morphine PF Epidural 1 milliGRAM(s) Epidural once  oxytocin Infusion 333.333 milliUNIT(s)/Min (1000 mL/Hr) IV Continuous <Continuous>  oxytocin Infusion 333.333 milliUNIT(s)/Min (1000 mL/Hr) IV Continuous <Continuous>  oxytocin Infusion 333.333 milliUNIT(s)/Min (1000 mL/Hr) IV Continuous <Continuous>    MEDICATIONS  (PRN):  dexAMETHasone  Injectable 4 milliGRAM(s) IV Push every 6 hours PRN Nausea  diphenhydrAMINE 25 milliGRAM(s) Oral every 6 hours PRN Pruritus  lanolin Ointment 1 Application(s) Topical every 6 hours PRN Sore Nipples  magnesium hydroxide Suspension 30 milliLiter(s) Oral two times a day PRN Constipation  naloxone Injectable 0.1 milliGRAM(s) IV Push every 3 minutes PRN For ANY of the following changes in patient status:  A. Breaths Per Minute LESS THAN 10, B. Oxygen saturation LESS THAN 90%, C. Sedation score of 6 for Stop After: 4 Times  ondansetron Injectable 4 milliGRAM(s) IV Push every 6 hours PRN Nausea  oxyCODONE    IR 5 milliGRAM(s) Oral every 3 hours PRN Moderate to Severe Pain (4-10)  oxyCODONE    IR 5 milliGRAM(s) Oral once PRN Moderate to Severe Pain (4-10)  senna 1 Tablet(s) Oral two times a day PRN Constipation  simethicone 80 milliGRAM(s) Chew every 4 hours PRN Gas      LABS:  Blood type: A Positive  Rubella IgG: RPR: Negative                          9.3<L>   14.01<H> >-----------< 153    ( 07-30 @ 05:49 )             28.8<L>        Physical exam:  Gen: NAD  Abdomen: Soft, nontender, no distension , firm uterine fundus at umbilicus.  Incision: Clean, dry, and intact   VE: No heavy vaginal bleeding  Ext: No calf tenderness      
ANESTHESIA POSTOP CHECK    21y Female POSTOP DAY 1     Vital Signs Last 24 Hrs  T(C): 36.7 (30 Jul 2021 05:42), Max: 37.2 (29 Jul 2021 21:52)  T(F): 98 (30 Jul 2021 05:42), Max: 98.9 (29 Jul 2021 21:52)  HR: 79 (30 Jul 2021 05:42) (79 - 104)  BP: 117/70 (30 Jul 2021 05:42) (110/66 - 122/62)  BP(mean): --  RR: 18 (30 Jul 2021 05:42) (18 - 20)  SpO2: 100% (30 Jul 2021 05:42) (98% - 100%)  I&O's Summary    29 Jul 2021 07:01  -  30 Jul 2021 07:00  --------------------------------------------------------  IN: 800 mL / OUT: 1755 mL / NET: -955 mL        [X ] NO APPARENT ANESTHESIA COMPLICATIONS      Comments: 
Postop Day  __1_ s/p   C- Section    THERAPY:  [  ] Spinal morphine   [x  ] Epidural morphine   [  ] IV PCA Hydromorphone 1 mg/ml    acetaminophen   Tablet .. 975 milliGRAM(s) Oral <User Schedule>  dexAMETHasone  Injectable 4 milliGRAM(s) IV Push every 6 hours PRN  diphenhydrAMINE 25 milliGRAM(s) Oral every 6 hours PRN  diphtheria/tetanus/pertussis (acellular) Vaccine (ADAcel) 0.5 milliLiter(s) IntraMuscular once  heparin   Injectable 5000 Unit(s) SubCutaneous every 12 hours  ibuprofen  Tablet. 600 milliGRAM(s) Oral every 6 hours  lactated ringers. 1000 milliLiter(s) IV Continuous <Continuous>  lanolin Ointment 1 Application(s) Topical every 6 hours PRN  magnesium hydroxide Suspension 30 milliLiter(s) Oral two times a day PRN  morphine PF Epidural 1 milliGRAM(s) Epidural once  naloxone Injectable 0.1 milliGRAM(s) IV Push every 3 minutes PRN  ondansetron Injectable 4 milliGRAM(s) IV Push every 6 hours PRN  oxyCODONE    IR 5 milliGRAM(s) Oral every 3 hours PRN  oxyCODONE    IR 5 milliGRAM(s) Oral once PRN  oxytocin Infusion 333.333 milliUNIT(s)/Min IV Continuous <Continuous>  oxytocin Infusion 333.333 milliUNIT(s)/Min IV Continuous <Continuous>  oxytocin Infusion 333.333 milliUNIT(s)/Min IV Continuous <Continuous>  simethicone 80 milliGRAM(s) Chew every 4 hours PRN      T(C): 36.7 (07-30-21 @ 05:42), Max: 37.2 (07-29-21 @ 21:52)  HR: 79 (07-30-21 @ 05:42) (79 - 104)  BP: 117/70 (07-30-21 @ 05:42) (110/66 - 122/62)  RR: 18 (07-30-21 @ 05:42) (18 - 20)  SpO2: 100% (07-30-21 @ 05:42) (98% - 100%)    Pain:   ______mild_____ at rest;  _____moderate______with activity    Sedation Score:	  [ x ] Alert	    [  ] Drowsy        [  ] Arousable	[  ] Asleep	[  ] Unresponsive    Side Effects:	  [x  ] None	     [  ] Nausea        [  ] Pruritus        [  ] Weakness   [  ] Numbness        ASSESSMENT/ PLAN  [   ] Side effects resolving      [   x] Patient made aware of PRN meds available     [ x] Discontinue & switch to PRN pain medications        [  ] Continue       Patient states lower extremities feel and move normally. No apparent anesthetic complications.
Patient seen and examined at bedside, no acute overnight events. No acute complaints, pain well controlled. Patient is ambulating and tolerating regular diet. Has not had a bowel movement. Denies CP, SOB, N/V, HA, dizziness upon standing.    Vital Signs Last 24 Hours  T(C): 36.7 (07-30-21 @ 05:42), Max: 37.2 (07-29-21 @ 21:52)  HR: 79 (07-30-21 @ 05:42) (79 - 106)  BP: 117/70 (07-30-21 @ 05:42) (109/85 - 122/82)  RR: 18 (07-30-21 @ 05:42) (18 - 25)  SpO2: 100% (07-30-21 @ 05:42) (97% - 100%)    I&O's Summary    29 Jul 2021 07:01  -  30 Jul 2021 07:00  --------------------------------------------------------  IN: 800 mL / OUT: 1755 mL / NET: -955 mL        Physical Exam:  General: NAD  Abdomen: Soft, non-tender, non-distended, fundus firm  Incision: Pfannenstiel incision CDI, subcuticular suture closure  Pelvic: Lochia wnl    Labs:    Blood Type: A Positive  Antibody Screen: --  RPR: Negative               9.3    14.01 )-----------( 153      ( 07-30 @ 05:49 )             28.8                11.8   9.64  )-----------( 267      ( 07-27 @ 21:10 )             37.4         MEDICATIONS  (STANDING):  acetaminophen   Tablet .. 975 milliGRAM(s) Oral <User Schedule>  diphtheria/tetanus/pertussis (acellular) Vaccine (ADAcel) 0.5 milliLiter(s) IntraMuscular once  heparin   Injectable 5000 Unit(s) SubCutaneous every 12 hours  ibuprofen  Tablet. 600 milliGRAM(s) Oral every 6 hours  lactated ringers. 1000 milliLiter(s) (75 mL/Hr) IV Continuous <Continuous>  morphine PF Epidural 1 milliGRAM(s) Epidural once  oxytocin Infusion 333.333 milliUNIT(s)/Min (1000 mL/Hr) IV Continuous <Continuous>  oxytocin Infusion 333.333 milliUNIT(s)/Min (1000 mL/Hr) IV Continuous <Continuous>  oxytocin Infusion 333.333 milliUNIT(s)/Min (1000 mL/Hr) IV Continuous <Continuous>    MEDICATIONS  (PRN):  dexAMETHasone  Injectable 4 milliGRAM(s) IV Push every 6 hours PRN Nausea  diphenhydrAMINE 25 milliGRAM(s) Oral every 6 hours PRN Pruritus  lanolin Ointment 1 Application(s) Topical every 6 hours PRN Sore Nipples  magnesium hydroxide Suspension 30 milliLiter(s) Oral two times a day PRN Constipation  naloxone Injectable 0.1 milliGRAM(s) IV Push every 3 minutes PRN For ANY of the following changes in patient status:  A. Breaths Per Minute LESS THAN 10, B. Oxygen saturation LESS THAN 90%, C. Sedation score of 6 for Stop After: 4 Times  ondansetron Injectable 4 milliGRAM(s) IV Push every 6 hours PRN Nausea  oxyCODONE    IR 5 milliGRAM(s) Oral every 3 hours PRN Moderate to Severe Pain (4-10)  oxyCODONE    IR 5 milliGRAM(s) Oral once PRN Moderate to Severe Pain (4-10)  simethicone 80 milliGRAM(s) Chew every 4 hours PRN Gas

## 2021-08-01 NOTE — PROGRESS NOTE ADULT - ASSESSMENT
A/P: 22yo POD#3 s/p LTCS.  Patient is stable and doing well post-operatively.    - Continue regular diet.  - Increase ambulation.  - Continue motrin, tylenol, oxycodone PRN for pain control.   - D/C planning    Tameka Land, PGY1  
  A/P: 20yo POD#2 s/p LTCS.  Patient is stable and doing well post-operatively.    - Continue regular diet.  - Increase ambulation.  - Continue motrin, tylenol, oxycodone PRN for pain control.     Cecelia Wilson, PGY-1  
20y/o  POD#1 from primary low transverse  section for non-reassuring fetal heart tracing. H/H today AM 9.3/28.8 from 11.8/37.4 on . Vital signs stable overnight. Patient is currently in stable condition.

## 2021-09-08 NOTE — OB RN TRIAGE NOTE - TERM DELIVERIES, OB PROFILE
EKG 8/23/2021   WNL EKG 8/23/2021   WNL EKG 8/23/2021   WNL EKG 8/23/2021   WNL 0 EKG 8/23/2021   WNL EKG 8/23/2021   WNL EKG 8/23/2021   WNL EKG 8/23/2021   WNL EKG 8/23/2021   WNL EKG 8/23/2021   WNL EKG 8/23/2021   WNL EKG 8/23/2021   WNL

## 2021-10-26 NOTE — DISCHARGE NOTE OB - REDNESS, SWELLING, YELLOW-GREEN OR BLOODY DISCHARGE FROM YOUR INCISION
Statement Selected
[[email or web address]_ALL_OB_FT:[id number]],[NS_DeliveryAssist1_OBGYN_ALL_OB_FT:[id number]],[NS_DeliveryRN_OBGYN_ALL_OB_FT:[id number]]
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
## 2024-12-03 NOTE — OB RN PATIENT PROFILE - BSA (M2)
Problem: Adult Inpatient Plan of Care  Goal: Optimal Comfort and Wellbeing  Outcome: Progressing  Intervention: Provide Person-Centered Care  Flowsheets (Taken 12/3/2024 0327)  Trust Relationship/Rapport:   care explained   choices provided   emotional support provided   empathic listening provided   questions answered   questions encouraged   reassurance provided   thoughts/feelings acknowledged     Problem: Wound  Goal: Optimal Functional Ability  Outcome: Progressing  Intervention: Optimize Functional Ability  Flowsheets (Taken 12/3/2024 0327)  Activity Management:   Rolling - L1   Ankle pumps - L1   Arm raise - L1   Straight leg raise - L1  Activity Assistance Provided: assistance, 1 person  Goal: Absence of Infection Signs and Symptoms  Outcome: Progressing  Intervention: Prevent or Manage Infection  Flowsheets (Taken 12/3/2024 0327)  Fever Reduction/Comfort Measures:   lightweight bedding   lightweight clothing     Problem: Fall Injury Risk  Goal: Absence of Fall and Fall-Related Injury  Outcome: Progressing  Intervention: Promote Injury-Free Environment  Flowsheets (Taken 12/3/2024 0327)  Safety Promotion/Fall Prevention:   assistive device/personal item within reach   side rails raised x 2   supervised activity   nonskid shoes/socks when out of bed   medications reviewed   Fall Risk reviewed with patient/family      1.93